# Patient Record
Sex: MALE | Race: WHITE | URBAN - METROPOLITAN AREA
[De-identification: names, ages, dates, MRNs, and addresses within clinical notes are randomized per-mention and may not be internally consistent; named-entity substitution may affect disease eponyms.]

---

## 2018-01-01 ENCOUNTER — APPOINTMENT (OUTPATIENT)
Dept: GENERAL RADIOLOGY | Age: 58
End: 2018-01-01
Payer: COMMERCIAL

## 2018-01-01 ENCOUNTER — HOSPITAL ENCOUNTER (EMERGENCY)
Age: 58
End: 2018-07-27
Attending: EMERGENCY MEDICINE | Admitting: SURGERY
Payer: COMMERCIAL

## 2018-01-01 ENCOUNTER — ANESTHESIA (OUTPATIENT)
Dept: OPERATING ROOM | Age: 58
End: 2018-01-01
Payer: COMMERCIAL

## 2018-01-01 ENCOUNTER — ANESTHESIA EVENT (OUTPATIENT)
Dept: OPERATING ROOM | Age: 58
End: 2018-01-01
Payer: COMMERCIAL

## 2018-01-01 VITALS — OXYGEN SATURATION: 91 % | HEART RATE: 103 BPM | SYSTOLIC BLOOD PRESSURE: 115 MMHG | DIASTOLIC BLOOD PRESSURE: 46 MMHG

## 2018-01-01 VITALS — OXYGEN SATURATION: 47 % | DIASTOLIC BLOOD PRESSURE: 82 MMHG | SYSTOLIC BLOOD PRESSURE: 166 MMHG

## 2018-01-01 DIAGNOSIS — I46.8 TRAUMATIC CARDIAC ARREST (HCC): Primary | ICD-10-CM

## 2018-01-01 DIAGNOSIS — T14.90XA TRAUMA: ICD-10-CM

## 2018-01-01 DIAGNOSIS — I46.9 CARDIORESPIRATORY ARREST (HCC): ICD-10-CM

## 2018-01-01 LAB
ABO/RH: NORMAL
ACETAMINOPHEN LEVEL: <5 MCG/ML (ref 10–30)
ALBUMIN SERPL-MCNC: 3.3 G/DL (ref 3.5–5.2)
ALP BLD-CCNC: 89 U/L (ref 40–129)
ALT SERPL-CCNC: 1149 U/L (ref 0–40)
ANION GAP SERPL CALCULATED.3IONS-SCNC: 32 MMOL/L (ref 7–16)
ANTIBODY SCREEN: NORMAL
APTT: 75.8 SEC (ref 24.5–35.1)
AST SERPL-CCNC: 937 U/L (ref 0–39)
B.E.: -13.3 MMOL/L (ref -3–0)
BILIRUB SERPL-MCNC: 0.3 MG/DL (ref 0–1.2)
BLOOD BANK DISPENSE STATUS: NORMAL
BLOOD BANK PRODUCT CODE: NORMAL
BPU ID: NORMAL
BUN BLDV-MCNC: 10 MG/DL (ref 6–20)
CALCIUM SERPL-MCNC: 9 MG/DL (ref 8.6–10.2)
CARDIOPULMONARY BYPASS: NO
CHLORIDE BLD-SCNC: 99 MMOL/L (ref 98–107)
CO2: 11 MMOL/L (ref 22–29)
COHB: 0.3 % (ref 0–1.5)
COMMENT: ABNORMAL
CREAT SERPL-MCNC: 1.2 MG/DL (ref 0.7–1.2)
CRITICAL NOTIFICATION: YES
CRITICAL: ABNORMAL
DATE ANALYZED: ABNORMAL
DATE OF COLLECTION: ABNORMAL
DESCRIPTION BLOOD BANK: NORMAL
DEVICE: ABNORMAL
ETHANOL: <10 MG/DL (ref 0–0.08)
FIO2 ARTERIAL: 100
FIO2: 100 %
GFR AFRICAN AMERICAN: >60
GFR NON-AFRICAN AMERICAN: >60 ML/MIN/1.73
GLUCOSE BLD-MCNC: 327 MG/DL (ref 74–109)
HCO3 ARTERIAL: 16.2 MMOL/L (ref 22–26)
HCT VFR BLD CALC: 34.6 % (ref 37–54)
HCT,ARTERIAL: 23 % (ref 37–54)
HEMOGLOBIN: 9.1 G/DL (ref 12.5–16.5)
HGB, ARTERIAL: 7.7 G/DL (ref 12.5–16.5)
HHB: 16.1 % (ref 0–5)
INR BLD: 1.2
LAB: ABNORMAL
LACTIC ACID: 19.2 MMOL/L (ref 0.5–2.2)
Lab: 1412
MCH RBC QN AUTO: 23.6 PG (ref 26–35)
MCHC RBC AUTO-ENTMCNC: 26.3 % (ref 32–34.5)
MCV RBC AUTO: 89.9 FL (ref 80–99.9)
METHB: 0.7 % (ref 0–1.5)
MODE: AC
O2 CONTENT: 12.6 ML/DL
O2 SATURATION: 83.7 % (ref 92–98.5)
O2 SATURATION: 84.5 % (ref 92–98.5)
O2HB: 82.9 % (ref 94–97)
OPERATOR ID: ABNORMAL
OPERATOR ID: ABNORMAL
PATIENT TEMP: 37 C
PCO2 ARTERIAL: 56.1 MMHG (ref 35–45)
PCO2: ABNORMAL MMHG (ref 35–45)
PDW BLD-RTO: 16 FL (ref 11.5–15)
PEEP/CPAP: 8 CMH?O
PFO2: 1 MMHG/%
PH BLOOD GAS: 7.07 (ref 7.35–7.45)
PH BLOOD GAS: ABNORMAL (ref 7.35–7.45)
PLATELET # BLD: 157 E9/L (ref 130–450)
PMV BLD AUTO: 9.3 FL (ref 7–12)
PO2 ARTERIAL: 69.4 MMHG (ref 80–100)
PO2: 99.7 MMHG (ref 60–100)
POTASSIUM SERPL-SCNC: 4.9 MMOL/L (ref 3.5–5)
POTASSIUM SERPL-SCNC: 4.93 MMOL/L (ref 3.3–5.1)
POTASSIUM SERPL-SCNC: 6.9 MMOL/L (ref 3.5–5.5)
PROTHROMBIN TIME: 13.9 SEC (ref 9.3–12.4)
RBC # BLD: 3.85 E12/L (ref 3.8–5.8)
RR MECHANICAL: 20 B/MIN
SALICYLATE, SERUM: <0.3 MG/DL (ref 0–30)
SODIUM BLD-SCNC: 142 MMOL/L (ref 132–146)
SOURCE, BLOOD GAS: ABNORMAL
SOURCE, BLOOD GAS: ABNORMAL
THB: 10.7 G/DL (ref 11.5–16.5)
TIME ANALYZED: 1413
TOTAL PROTEIN: 5.4 G/DL (ref 6.4–8.3)
TRICYCLIC ANTIDEPRESSANTS SCREEN SERUM: NEGATIVE NG/ML
VT MECHANICAL: 500 ML
WBC # BLD: 4.8 E9/L (ref 4.5–11.5)

## 2018-01-01 PROCEDURE — 86850 RBC ANTIBODY SCREEN: CPT

## 2018-01-01 PROCEDURE — 2709999900 HC NON-CHARGEABLE SUPPLY: Performed by: SURGERY

## 2018-01-01 PROCEDURE — 86923 COMPATIBILITY TEST ELECTRIC: CPT

## 2018-01-01 PROCEDURE — 85027 COMPLETE CBC AUTOMATED: CPT

## 2018-01-01 PROCEDURE — G0390 TRAUMA RESPONS W/HOSP CRITI: HCPCS

## 2018-01-01 PROCEDURE — 80053 COMPREHEN METABOLIC PANEL: CPT

## 2018-01-01 PROCEDURE — 2580000003 HC RX 258: Performed by: NURSE ANESTHETIST, CERTIFIED REGISTERED

## 2018-01-01 PROCEDURE — 93308 TTE F-UP OR LMTD: CPT | Performed by: SURGERY

## 2018-01-01 PROCEDURE — 36415 COLL VENOUS BLD VENIPUNCTURE: CPT

## 2018-01-01 PROCEDURE — 2580000003 HC RX 258: Performed by: ANESTHESIOLOGY

## 2018-01-01 PROCEDURE — 86920 COMPATIBILITY TEST SPIN: CPT

## 2018-01-01 PROCEDURE — 49000 EXPLORATION OF ABDOMEN: CPT | Performed by: SURGERY

## 2018-01-01 PROCEDURE — 83605 ASSAY OF LACTIC ACID: CPT

## 2018-01-01 PROCEDURE — 3600000014 HC SURGERY LEVEL 4 ADDTL 15MIN: Performed by: SURGERY

## 2018-01-01 PROCEDURE — 2720000010 HC SURG SUPPLY STERILE: Performed by: SURGERY

## 2018-01-01 PROCEDURE — 99223 1ST HOSP IP/OBS HIGH 75: CPT | Performed by: SURGERY

## 2018-01-01 PROCEDURE — 85730 THROMBOPLASTIN TIME PARTIAL: CPT

## 2018-01-01 PROCEDURE — 6360000002 HC RX W HCPCS: Performed by: NURSE ANESTHETIST, CERTIFIED REGISTERED

## 2018-01-01 PROCEDURE — 85610 PROTHROMBIN TIME: CPT

## 2018-01-01 PROCEDURE — 71045 X-RAY EXAM CHEST 1 VIEW: CPT

## 2018-01-01 PROCEDURE — 94150 VITAL CAPACITY TEST: CPT

## 2018-01-01 PROCEDURE — P9059 PLASMA, FRZ BETWEEN 8-24HOUR: HCPCS

## 2018-01-01 PROCEDURE — 6360000002 HC RX W HCPCS: Performed by: STUDENT IN AN ORGANIZED HEALTH CARE EDUCATION/TRAINING PROGRAM

## 2018-01-01 PROCEDURE — 86901 BLOOD TYPING SEROLOGIC RH(D): CPT

## 2018-01-01 PROCEDURE — P9035 PLATELET PHERES LEUKOREDUCED: HCPCS

## 2018-01-01 PROCEDURE — 82803 BLOOD GASES ANY COMBINATION: CPT

## 2018-01-01 PROCEDURE — 2500000003 HC RX 250 WO HCPCS: Performed by: ANESTHESIOLOGY

## 2018-01-01 PROCEDURE — 6360000002 HC RX W HCPCS: Performed by: EMERGENCY MEDICINE

## 2018-01-01 PROCEDURE — 72170 X-RAY EXAM OF PELVIS: CPT

## 2018-01-01 PROCEDURE — 84132 ASSAY OF SERUM POTASSIUM: CPT

## 2018-01-01 PROCEDURE — 6360000002 HC RX W HCPCS

## 2018-01-01 PROCEDURE — 3700000000 HC ANESTHESIA ATTENDED CARE: Performed by: SURGERY

## 2018-01-01 PROCEDURE — P9016 RBC LEUKOCYTES REDUCED: HCPCS

## 2018-01-01 PROCEDURE — 80307 DRUG TEST PRSMV CHEM ANLYZR: CPT

## 2018-01-01 PROCEDURE — 86900 BLOOD TYPING SEROLOGIC ABO: CPT

## 2018-01-01 PROCEDURE — G0480 DRUG TEST DEF 1-7 CLASSES: HCPCS

## 2018-01-01 PROCEDURE — 3700000001 HC ADD 15 MINUTES (ANESTHESIA): Performed by: SURGERY

## 2018-01-01 PROCEDURE — 2500000003 HC RX 250 WO HCPCS: Performed by: NURSE ANESTHETIST, CERTIFIED REGISTERED

## 2018-01-01 PROCEDURE — 3600000004 HC SURGERY LEVEL 4 BASE: Performed by: SURGERY

## 2018-01-01 PROCEDURE — 82805 BLOOD GASES W/O2 SATURATION: CPT

## 2018-01-01 PROCEDURE — 20102 EXPL PENTRG WND ABD/FLNK/BK: CPT | Performed by: SURGERY

## 2018-01-01 PROCEDURE — 99285 EMERGENCY DEPT VISIT HI MDM: CPT

## 2018-01-01 RX ORDER — MIDAZOLAM HYDROCHLORIDE 1 MG/ML
INJECTION INTRAMUSCULAR; INTRAVENOUS PRN
Status: DISCONTINUED | OUTPATIENT
Start: 2018-01-01 | End: 2018-01-01 | Stop reason: SDUPTHER

## 2018-01-01 RX ORDER — EPINEPHRINE 1 MG/ML
INJECTION INTRAMUSCULAR; INTRAVENOUS; SUBCUTANEOUS PRN
Status: DISCONTINUED | OUTPATIENT
Start: 2018-01-01 | End: 2018-01-01 | Stop reason: SDUPTHER

## 2018-01-01 RX ORDER — CALCIUM CHLORIDE 100 MG/ML
INJECTION INTRAVENOUS; INTRAVENTRICULAR PRN
Status: DISCONTINUED | OUTPATIENT
Start: 2018-01-01 | End: 2018-01-01 | Stop reason: SDUPTHER

## 2018-01-01 RX ORDER — SODIUM CHLORIDE 9 MG/ML
INJECTION, SOLUTION INTRAVENOUS CONTINUOUS PRN
Status: DISCONTINUED | OUTPATIENT
Start: 2018-01-01 | End: 2018-01-01 | Stop reason: SDUPTHER

## 2018-01-01 RX ORDER — CEFAZOLIN SODIUM 1 G/3ML
INJECTION, POWDER, FOR SOLUTION INTRAMUSCULAR; INTRAVENOUS PRN
Status: DISCONTINUED | OUTPATIENT
Start: 2018-01-01 | End: 2018-01-01 | Stop reason: SDUPTHER

## 2018-01-01 RX ORDER — VASOPRESSIN 20 U/ML
INJECTION PARENTERAL PRN
Status: DISCONTINUED | OUTPATIENT
Start: 2018-01-01 | End: 2018-01-01 | Stop reason: SDUPTHER

## 2018-01-01 RX ORDER — VECURONIUM BROMIDE 1 MG/ML
INJECTION, POWDER, LYOPHILIZED, FOR SOLUTION INTRAVENOUS PRN
Status: DISCONTINUED | OUTPATIENT
Start: 2018-01-01 | End: 2018-01-01 | Stop reason: SDUPTHER

## 2018-01-01 RX ADMIN — SODIUM BICARBONATE 50 MEQ: 84 INJECTION, SOLUTION INTRAVENOUS at 14:47

## 2018-01-01 RX ADMIN — SODIUM BICARBONATE 50 MEQ: 84 INJECTION, SOLUTION INTRAVENOUS at 14:25

## 2018-01-01 RX ADMIN — SODIUM BICARBONATE 50 MEQ: 84 INJECTION, SOLUTION INTRAVENOUS at 15:16

## 2018-01-01 RX ADMIN — EPINEPHRINE 1 MG: 0.1 INJECTION, SOLUTION ENDOTRACHEAL; INTRACARDIAC; INTRAVENOUS at 13:48

## 2018-01-01 RX ADMIN — VASOPRESSIN 2 UNITS: 20 INJECTION INTRAVENOUS at 14:46

## 2018-01-01 RX ADMIN — SODIUM CHLORIDE: 9 INJECTION, SOLUTION INTRAVENOUS at 14:45

## 2018-01-01 RX ADMIN — VASOPRESSIN 2 UNITS: 20 INJECTION INTRAVENOUS at 14:30

## 2018-01-01 RX ADMIN — CALCIUM CHLORIDE 1 G: 100 INJECTION, SOLUTION INTRAVENOUS; INTRAVENTRICULAR at 14:58

## 2018-01-01 RX ADMIN — MIDAZOLAM 2 MG: 1 INJECTION INTRAMUSCULAR; INTRAVENOUS at 14:52

## 2018-01-01 RX ADMIN — SODIUM BICARBONATE 50 MEQ: 84 INJECTION, SOLUTION INTRAVENOUS at 14:43

## 2018-01-01 RX ADMIN — EPINEPHRINE 1 MG: 0.1 INJECTION, SOLUTION ENDOTRACHEAL; INTRACARDIAC; INTRAVENOUS at 14:08

## 2018-01-01 RX ADMIN — SODIUM BICARBONATE 50 MEQ: 84 INJECTION, SOLUTION INTRAVENOUS at 14:22

## 2018-01-01 RX ADMIN — CALCIUM CHLORIDE 1 G: 100 INJECTION, SOLUTION INTRAVENOUS; INTRAVENTRICULAR at 14:57

## 2018-01-01 RX ADMIN — VASOPRESSIN 2 UNITS: 20 INJECTION INTRAVENOUS at 15:06

## 2018-01-01 RX ADMIN — CALCIUM CHLORIDE 1 G: 100 INJECTION, SOLUTION INTRAVENOUS; INTRAVENTRICULAR at 15:20

## 2018-01-01 RX ADMIN — EPINEPHRINE 1 MG: 0.1 INJECTION, SOLUTION ENDOTRACHEAL; INTRACARDIAC; INTRAVENOUS at 15:22

## 2018-01-01 RX ADMIN — EPINEPHRINE 10 MCG: 1 INJECTION INTRAMUSCULAR; INTRAVENOUS; SUBCUTANEOUS at 14:43

## 2018-01-01 RX ADMIN — TRANEXAMIC ACID 1000 MG: 100 INJECTION, SOLUTION INTRAVENOUS at 14:38

## 2018-01-01 RX ADMIN — VASOPRESSIN 2 UNITS: 20 INJECTION INTRAVENOUS at 15:10

## 2018-01-01 RX ADMIN — EPINEPHRINE 20 MCG: 1 INJECTION INTRAMUSCULAR; INTRAVENOUS; SUBCUTANEOUS at 14:22

## 2018-01-01 RX ADMIN — Medication 4 MCG/MIN: at 14:46

## 2018-01-01 RX ADMIN — VASOPRESSIN 2 UNITS: 20 INJECTION INTRAVENOUS at 15:00

## 2018-01-01 RX ADMIN — VASOPRESSIN 2 UNITS: 20 INJECTION INTRAVENOUS at 14:50

## 2018-01-01 RX ADMIN — EPINEPHRINE 1 MG: 0.1 INJECTION, SOLUTION ENDOTRACHEAL; INTRACARDIAC; INTRAVENOUS at 14:45

## 2018-01-01 RX ADMIN — SODIUM CHLORIDE: 9 INJECTION, SOLUTION INTRAVENOUS at 14:22

## 2018-01-01 RX ADMIN — SODIUM CHLORIDE: 9 INJECTION, SOLUTION INTRAVENOUS at 14:55

## 2018-01-01 RX ADMIN — EPINEPHRINE 1 MG: 0.1 INJECTION, SOLUTION ENDOTRACHEAL; INTRACARDIAC; INTRAVENOUS at 15:11

## 2018-01-01 RX ADMIN — VASOPRESSIN 2 UNITS: 20 INJECTION INTRAVENOUS at 14:55

## 2018-01-01 RX ADMIN — EPINEPHRINE 20 MCG: 1 INJECTION INTRAMUSCULAR; INTRAVENOUS; SUBCUTANEOUS at 14:33

## 2018-01-01 RX ADMIN — EPINEPHRINE 1 MG: 0.1 INJECTION, SOLUTION ENDOTRACHEAL; INTRACARDIAC; INTRAVENOUS at 15:16

## 2018-01-01 RX ADMIN — VASOPRESSIN 2 UNITS: 20 INJECTION INTRAVENOUS at 15:22

## 2018-01-01 RX ADMIN — VECURONIUM BROMIDE FOR INJECTION 10 MG: 1 INJECTION, POWDER, LYOPHILIZED, FOR SOLUTION INTRAVENOUS at 14:33

## 2018-01-01 RX ADMIN — SODIUM BICARBONATE 50 MEQ: 84 INJECTION, SOLUTION INTRAVENOUS at 14:46

## 2018-01-01 RX ADMIN — CEFAZOLIN 2000 MG: 1 INJECTION, POWDER, FOR SOLUTION INTRAVENOUS at 14:33

## 2018-01-01 RX ADMIN — SODIUM CHLORIDE: 9 INJECTION, SOLUTION INTRAVENOUS at 15:14

## 2018-01-01 RX ADMIN — SODIUM BICARBONATE 50 MEQ: 84 INJECTION, SOLUTION INTRAVENOUS at 15:05

## 2018-01-01 RX ADMIN — CALCIUM CHLORIDE 1 G: 100 INJECTION, SOLUTION INTRAVENOUS; INTRAVENTRICULAR at 14:22

## 2018-01-01 RX ADMIN — SODIUM BICARBONATE 50 MEQ: 84 INJECTION, SOLUTION INTRAVENOUS at 14:45

## 2018-01-01 RX ADMIN — VASOPRESSIN 2 UNITS: 20 INJECTION INTRAVENOUS at 15:17

## 2018-01-01 RX ADMIN — VASOPRESSIN 2 UNITS: 20 INJECTION INTRAVENOUS at 14:41

## 2018-01-01 ASSESSMENT — PULMONARY FUNCTION TESTS
PIF_VALUE: 2
PIF_VALUE: 33
PIF_VALUE: 53
PIF_VALUE: 63
PIF_VALUE: 37
PIF_VALUE: 31
PIF_VALUE: 34
PIF_VALUE: 1
PIF_VALUE: 39
PIF_VALUE: 40
PIF_VALUE: 53
PIF_VALUE: 43
PIF_VALUE: 33
PIF_VALUE: 37
PIF_VALUE: 33
PIF_VALUE: 41
PIF_VALUE: 53
PIF_VALUE: 52
PIF_VALUE: 39
PIF_VALUE: 2
PIF_VALUE: 31
PIF_VALUE: 0
PIF_VALUE: 32
PIF_VALUE: 32
PIF_VALUE: 53
PIF_VALUE: 38
PIF_VALUE: 52
PIF_VALUE: 40
PIF_VALUE: 40
PIF_VALUE: 43
PIF_VALUE: 53
PIF_VALUE: 0
PIF_VALUE: 0
PIF_VALUE: 32
PIF_VALUE: 37
PIF_VALUE: 42
PIF_VALUE: 53
PIF_VALUE: 3
PIF_VALUE: 42
PIF_VALUE: 0
PIF_VALUE: 53
PIF_VALUE: 31
PIF_VALUE: 34
PIF_VALUE: 54
PIF_VALUE: 1
PIF_VALUE: 2
PIF_VALUE: 0
PIF_VALUE: 41
PIF_VALUE: 41
PIF_VALUE: 63
PIF_VALUE: 44
PIF_VALUE: 40
PIF_VALUE: 40
PIF_VALUE: 33
PIF_VALUE: 32
PIF_VALUE: 3
PIF_VALUE: 53
PIF_VALUE: 37
PIF_VALUE: 39
PIF_VALUE: 1
PIF_VALUE: 1
PIF_VALUE: 35
PIF_VALUE: 41
PIF_VALUE: 1
PIF_VALUE: 39
PIF_VALUE: 53
PIF_VALUE: 33
PIF_VALUE: 39
PIF_VALUE: 37
PIF_VALUE: 33
PIF_VALUE: 40
PIF_VALUE: 33
PIF_VALUE: 41
PIF_VALUE: 33
PIF_VALUE: 36
PIF_VALUE: 32
PIF_VALUE: 63

## 2018-07-27 NOTE — ED NOTES
3RD UNIT [facility] [id number] [address] [place] [address], DEANDRA  07/27/18 4358

## 2018-07-27 NOTE — PROCEDURES
Lauro Washington is a 62 y.o. male patient. No diagnosis found. No past medical history on file. Blood pressure (!) 115/46, pulse 103, SpO2 91 %. Central Line  Date/Time: 7/27/2018 4:13 PM  Performed by: Harrison Lainez  Authorized by: Chasity Che   Consent: The procedure was performed in an emergent situation. Verbal consent not obtained. Written consent not obtained. Site marked: the operative site was not marked  Imaging studies: imaging studies not available  Patient identity confirmed: anonymous protocol, patient vented/unresponsive  Indications: vascular access    Sedation:  Patient sedated: no  Preparation: skin prepped with ChloraPrep  Skin prep agent dried: skin prep agent completely dried prior to procedure  Location details: right femoral  Site selection rationale: Accessability, traumatic case, CPR  Patient position: flat  Catheter type: Cordis  Catheter size: 7.5 Fr  Pre-procedure: landmarks identified  Ultrasound guidance: no  Number of attempts: 1  Successful placement: yes  Post-procedure: line sutured and dressing applied  Assessment: blood return through all ports  Complications: None. Patient tolerance: Patient tolerated the procedure well with no immediate complications  Comments: Attending was present for the duration of the procedure.           Thelma Mcguire DO  7/27/2018

## 2018-07-27 NOTE — CONSULTS
Vascular Surgery Consultation Note    Reason for Consult:  Hemorrhagic shock, intra-op bleeding    HPI:    This is a 62 y.o. male who is admitted to the hospital as a trauma team in extremis. Per reports he fell from and was pinned by a tree/harness. Prolonged extrication with CPR en route and in TB. ROSC achieved and +fast taken for laparotomy. Vascular surgery called intra-op for refractory bleeding from mesentery/retroperioneum. CPR had been reinitiated intra-op and blood bank alert with massive transfusion occurring. Assesment/Plan  62 y.o. male with traumatic arrest, hemorrhagic shock, intraabdominal hemorrhage    Injuries non-survivable and refractory to heroic resuscitation. Please see Op note    Tasneem Wilson DO  7/27/18  3:53 PM      Agree with above. Limited examination secondary these issues. Intra-abdominal request to assist in control of bleeding as stated above.     Leanne Majors

## 2018-07-27 NOTE — PROGRESS NOTES
The patient arrived into the trauma already undergoing ACLS for a total of 15 minutes per EMS. He had suffered a blunt traumatic injury. He was intubated and pupils were fixed and dilated upon arrival and received 4 rounds of epinephrine. After one additional round of ACLS ROSC was achieved and resuscitation continued. Introducer was placed during this time and blood was given thru the level one. FAST exam was performed and initially was negative. CXR showed no signs of hemothorax of pneumothorax. Pelvic X-ray did not reveal an open pelvic book fracture and no femur fracture was appreciated. As resuscitation continued the patient lost pulses and ROSC was achieved with one round of ACLS. Repeat FAST exam was performed and was positive for blood noted in the right upper quadrant. Patient went stat to the OR at this point for exploratory laparotomy.     Dr. Avis Grant was present the entire time    Lexi Villalba MD

## 2018-07-27 NOTE — ANESTHESIA PRE PROCEDURE
History:  No past surgical history on file. Social History:    Social History   Substance Use Topics    Smoking status: Not on file    Smokeless tobacco: Not on file    Alcohol use Not on file                                Counseling given: Not Answered      Vital Signs (Current):   Vitals:    07/27/18 1405 07/27/18 1412 07/27/18 1415 07/27/18 1417   BP:  (!) 130/50 (!) 120/52 (!) 115/46   Pulse: 103 133 108 103   SpO2:  (!) 86% 92% 91%                                              BP Readings from Last 3 Encounters:   07/27/18 (!) 115/46   07/27/18 (!) 166/82       NPO Status:  unknown - intubated                                                                               BMI:   Wt Readings from Last 3 Encounters:   No data found for Wt     There is no height or weight on file to calculate BMI.    CBC:   Lab Results   Component Value Date    WBC 4.8 07/27/2018    RBC 3.85 07/27/2018    HGB 9.1 07/27/2018    HCT 34.6 07/27/2018    MCV 89.9 07/27/2018    RDW 16.0 07/27/2018     07/27/2018       CMP:   Lab Results   Component Value Date     07/27/2018    K 6.9 07/27/2018    CL 99 07/27/2018    CO2 11 07/27/2018    BUN 10 07/27/2018    CREATININE 1.2 07/27/2018    GFRAA >60 07/27/2018    LABGLOM >60 07/27/2018    GLUCOSE 327 07/27/2018    PROT 5.4 07/27/2018    CALCIUM 9.0 07/27/2018    BILITOT 0.3 07/27/2018    ALKPHOS 89 07/27/2018     07/27/2018    ALT 1,149 07/27/2018       POC Tests: No results for input(s): POCGLU, POCNA, POCK, POCCL, POCBUN, POCHEMO, POCHCT in the last 72 hours.     Coags:   Lab Results   Component Value Date    PROTIME 13.9 07/27/2018    INR 1.2 07/27/2018    APTT 75.8 07/27/2018       HCG (If Applicable): No results found for: PREGTESTUR, PREGSERUM, HCG, HCGQUANT     ABGs:   Lab Results   Component Value Date    PO2ART 69.4 07/27/2018    EUG9PQS 56.1 07/27/2018    VKI0YQJ 16.2 07/27/2018        Type & Screen (If Applicable):  No results found for: LABABO,

## 2018-07-27 NOTE — H&P
History:  unknown    Family History: Non-contributory    NSAID use in last 72 hours: unknown  Taken PCN in past:  unknown  Last food/drink: unknown  Last tetanus: unknown    Complaints:     Unable to assess    SECONDARY SURVEY  Head/scalp: No soft tissue injuries    Face: No soft tissue injuries    Eyes/ears/nose: sputum in nares    Pharynx/mouth:  No soft tissue injury, no malocclusion    Neck: No soft tissue injuries  Cervical spine tenderness: unable to assess  ROM:  Cervical collar in place    Chest wall:  CTAB, no crepitus appreciated, no soft tissue injuries     Heart:  tach    Abdomen: Soft, non-distended, no soft tissue injuries   Tenderness:  unable to assess    Pelvis: Stable, no soft tissue injuries, no pain with hip flexion   Tenderness: unable to assess    Thoracolumbar spine: No soft tissue injuries, no step-offs  Tenderness:  Unable to assess     Genitourinary:  no traumatic injuries    Rectum: lose rectal tone, no blood     Perineum: no traumatic injuries    Extremities:   Sensory unable to assess   Motor 0/5    Distal Pulses  Pulses decreased     Capillary refill   Left arm normal  Right arm normal  Left leg normal   Right leg normal    Procedures in ED: FFP and PRBC in trauma bay, ACLS see nursing notes      Radiology:  CXR: pend  PXR: pend    Consultations:      Admission/Diagnosis:   80 y.o. male s/p fall from tree with traumatic arrest and positive fast     Code Status: Full    Plan of Treatment:    Positive fast, Stat Exlap in [address] [place] [address] discussed with Dr. Bee Hutchinson on 2018 at 2:14 PM    Workup pendin Aleksandr Lancaster on 2018 at 2:14 PM  Attending Physician Statement:  I have examined the patient, reviewed the record, and discussed the case with the surgical team.  I agree with the assessment and plan with the following additions, corrections, and changes. Male trauma patient, pinned in tree by large branch.  Intubated at scene, CPR 15 minutes before

## 2018-07-27 NOTE — ED PROVIDER NOTES
Department of Emergency Medicine   ED  Provider Note  Admit Date/RoomTime: 7/27/2018  1:46 PM  ED Room: OR POOL /NONE                HPI:  7/27/18, Time: 2:16 PM  .       Adelaida Tidwell is a 62 y.o. male presenting to the ED as a trauma team, beginning just prior to arrival .  The complaint has been constant,severe in severity. Patient was working on a tree entry removal. Patient was harnessed in when he cut a branch this resulted in him being pulled and then crushed between branch and trunk. Patient was unconscious at EMS arrival state there is a 30 minute time frame to extricate the patient. Patient was intubated on scene. They state pulses were lost 15 minutes out. Patient has been in PEA arrest since. Please note, this patient arrived as a Trauma Team and the trauma service assumed the care of this patient on their arrival    Initial evaluation occurred with trauma services at bedside. This patients disposition will be determined by trauma services. Glascow Coma Scale at time of initial examination  Best Eye Response 1 - Does not open eyes   Best Verbal Response 1 - Makes no noise   Best Motor Response 1 - No motor response to pain   Total 3         Review of Systems:   Pertinent positives and negatives are stated within HPI, all other systems reviewed and are negative.              --------------------------------------------- PAST HISTORY ---------------------------------------------  Past Medical History:  has no past medical history on file. Past Surgical History:  has no past surgical history on file. Social History:      Family History: family history is not on file. The patients home medications have been reviewed. Allergies: Patient has no allergy information on record.            ------------------------- NURSING NOTES AND VITALS REVIEWED ---------------------------   The nursing notes within the ED encounter and vital signs as below have been reviewed.    BP (!) 115/46 pH, Blood Gas (<) 7.350 - 7.450    PCO2  35.0 - 45.0 mmHg    PO2 99.7 60.0 - 100.0 mmHg    O2 Sat 83.7 (L) 92.0 - 98.5 %    PO2/FIO2 1.00 mmHg/%    O2Hb 82.9 (L) 94.0 - 97.0 %    COHb 0.3 0.0 - 1.5 %    MetHb 0.7 0.0 - 1.5 %    O2 Content 12.6 mL/dL    HHb 16.1 (H) 0.0 - 5.0 %    tHb (est) 10.7 (L) 11.5 - 16.5 g/dL    Potassium 4.93 3.30 - 5.10 mmol/L    Mode AC     FIO2 100.0 %    Rr Mechanical 20.0 b/min    Vt Mechanical 500 mL    Peep/Cpap 8.0 cmH? O    Comment       PH < 7.21, ANALYZER DID NOT GIVE PCO2 RESULT        SAMPLE RE RAN FORGOT TO PUT VENT SETTINGS    Date Of Collection [id number]     Time Collected 1412     Pt Temp 37.0 C     ID [id number]     Lab [id number]     Critical(s) Notified Handed report to Dr/RN    Arterial Blood Gas, Respiratory Only   Result Value Ref Range    Source: Arterial     FIO2 Arterial 100.0     pH, Blood Gas 7.067 (LL) 7.350 - 7.450    pCO2, Arterial 56.1 (H) 35.0 - 45.0 mmHg    pO2, Arterial 69.4 (L) 80.0 - 100.0 mmHg    HCO3, Arterial 16.2 (L) 22.0 - 26.0 mmol/L    B.E. -13.3 (L) -3.0 - 0.0 mmol/L    O2 Sat 84.5 (L) 92.0 - 98.5 %    Potassium 6.9 (HH) 3.5 - 5.5 mmol/L    HGB, Arterial 7.7 (L) 12.5 - 16.5 g/dL    HCT, Arterial 23.0 (L) 37.0 - 54.0 %    Cardiopulmonary Bypass No      ,511     DEVICE [phone]     Critical Notification Yes    Type and Screen   Result Value Ref Range    ABO/Rh O POS     Antibody Screen NEG    Prepare RBC Crossmatch   Result Value Ref Range    Product Code Blood Bank [id number]     Description Blood Bank Red Blood Cells, Apheresis, Leuko-reduced     Unit Number [id number]     Dispense Status Blood Bank transfused     Product Code Blood Bank N5112B00     Description Blood Bank Red Blood Cells, Apheresis, Leuko-reduced     Unit Number [id number]     Dispense Status Blood Bank transfused     Product Code Blood Bank K9484B87     Description Blood Bank Red Blood Cells, Leuko-reduced     Unit Number [id number]     Dispense Unit Number [id number]     Dispense Status Blood Bank released     Product Code Blood Bank G8691S38     Description Blood Bank Red Blood Cells, Leuko-reduced     Unit Number [id number]     Dispense Status Blood Bank released     Product Code Blood Bank J6988P68     Description Blood Bank Red Blood Cells, Leuko-reduced     Unit Number [id number]     Dispense Status Blood Bank released     Product Code Blood Bank G2397U03     Description Blood Bank Red Blood Cells, Leuko-reduced     Unit Number [id number]     Dispense Status Blood Bank released     Product Code Blood Bank S4576V26     Description Blood Bank Red Blood Cells, Leuko-reduced     Unit Number [id number]     Dispense Status Blood Bank released     Product Code Blood Bank M2998Q00     Description Blood Bank Red Blood Cells, Leuko-reduced     Unit Number [id number]     Dispense Status Blood Bank transfused     Product Code Blood Bank I3085U62     Description Blood Bank Red Blood Cells, Leuko-reduced     Unit Number [id number]     Dispense Status Blood Bank transfused     Product Code Blood Bank M0542Y64     Description Blood Bank Red Blood Cells, Leuko-reduced     Unit Number [id number]     Dispense Status Blood Bank transfused     Product Code Blood Bank G2571C06     Description Blood Bank Red Blood Cells, Leuko-reduced     Unit Number [id number]     Dispense Status Blood Bank transfused     Product Code Blood Bank S9418Y11     Description Blood Bank Red Blood Cells, Leuko-reduced     Unit Number [id number]     Dispense Status Blood Bank transfused     Product Code Blood Bank H7527M09     Description Blood Bank Red Blood Cells, Leuko-reduced     Unit Number [id number]     Dispense Status Blood Bank transfused     Product Code Blood Bank K2416M15     Description Blood Bank Red Blood Cells, Leuko-reduced     Unit Number [id number]     Dispense Status Blood Bank transfused     Product Code Blood Bank M5051S21     Description Blood Bank Red Blood Cells, Leuko-reduced     Unit Number [id number]     Dispense Status Blood Bank transfused    Prepare fresh frozen plasma   Result Value Ref Range    Product Code Blood Bank E8679A75     Description Blood Bank Plasma, 5 Day, Thawed     Unit Number [id number]     Dispense Status Blood Bank transfused     Product Code Blood Bank K6045E27     Description Blood Bank Plasma, 5 Day, Thawed     Unit Number [id number]     Dispense Status Blood Bank transfused     Product Code Blood Bank R2252G15     Description Blood Bank Plasma, 5 Day, Thawed     Unit Number [id number]     Dispense Status Blood Bank transfused     Product Code Blood Bank I7611L19     Description Blood Bank Plasma, 5 Day, Thawed     Unit Number [id number]     Dispense Status Blood Bank transfused     Product Code Blood Bank B7806L50     Description Blood Bank Plasma, 5 Day, Thawed     Unit Number [id number]     Dispense Status Blood Bank transfused     Product Code Blood Bank T5256L67     Description Blood Bank Plasma, 5 Day, Thawed     Unit Number [id number]     Dispense Status Blood Bank transfused     Product Code Blood Bank J1436L30     Description Blood Bank Plasma, 5 Day, Thawed     Unit Number [id number]     Dispense Status Blood Bank transfused     Product Code Blood Bank P8399H51     Description Blood Bank Plasma, 5 Day, Thawed     Unit Number [id number]     Dispense Status Blood Bank transfused     Product Code Blood Bank K1339R38     Description Blood Bank Plasma, 5 Day, Thawed     Unit Number [id number]     Dispense Status Blood Bank transfused     Product Code Blood Bank N0589O09     Description Blood Bank Plasma, 5 Day, Thawed     Unit Number [id number]     Dispense Status Blood Bank transfused     Product Code Blood Bank R8551K18     Description Blood Bank Plasma, 5 Day, Thawed     Unit Number [id number]     Dispense Status Blood Bank transfused     Product Code Blood Bank P2046Y02 Description Blood Bank Plasma, 5 Day, Thawed     Unit Number [id number]     Dispense Status Blood Bank transfused     Product Code Blood Bank U2598F11     Description Blood Bank Plasma, 5 Day, Thawed     Unit Number [id number]     Dispense Status Blood Bank released     Product Code Blood Bank U3746P99     Description Blood Bank Plasma, 5 Day, Thawed     Unit Number [id number]     Dispense Status Blood Bank released     Product Code Blood Bank D0652J82     Description Blood Bank Plasma, 5 Day, Thawed     Unit Number [id number]     Dispense Status Blood Bank released     Product Code Blood Bank A7239E74     Description Blood Bank Plasma, 5 Day, Thawed     Unit Number [id number]     Dispense Status Blood Bank released     Product Code Blood Bank X0914N92     Description Blood Bank Plasma, 5 Day, Thawed     Unit Number [id number]     Dispense Status Blood Bank released     Product Code Blood Bank R7270H83     Description Blood Bank Plasma, 5 Day, Thawed     Unit Number [id number]     Dispense Status Blood Bank transfused     Product Code Blood Bank A8764X10     Description Blood Bank Plasma, 5 Day, Thawed     Unit Number [id number]     Dispense Status Blood Bank transfused     Product Code Blood Bank Y2244R13     Description Blood Bank Plasma, 5 Day, Thawed     Unit Number [id number]     Dispense Status Blood Bank transfused     Product Code Blood Bank C1208Y38     Description Blood Bank Plasma, 5 Day, Thawed     Unit Number [id number]     Dispense Status Blood Bank transfused    Prepare Platelets (Crossmatch)   Result Value Ref Range    Product Code Blood Bank P0599S09     Description Blood Bank Platelets, Apheresis, Leuko-reduced     Unit Number [id number]     Dispense Status Blood Bank transfused     Product Code Blood Bank T3837P72     Description Blood Bank Platelets, Apheresis, Leuko-reduced     Unit Number [id number]     Dispense Status Blood Bank transfused    Prepare

## 2018-07-27 NOTE — ED NOTES
Patient log rolled maintaining spinal neutrality. NO stepoffs OR deformities noted.  LOOSE RECTAL TONE, NO GROSS BLOOD NOTED BY DR. Luci Pulido, RN  07/27/18 1635

## 2018-07-28 LAB
B.E.: -16.9 MMOL/L (ref -3–0)
CARDIOPULMONARY BYPASS: NO
CRITICAL NOTIFICATION: YES
DEVICE: ABNORMAL
FIO2 ARTERIAL: 100
HCO3 ARTERIAL: 15.5 MMOL/L (ref 22–26)
HCT,ARTERIAL: 30 % (ref 37–54)
HGB, ARTERIAL: 10.2 G/DL (ref 12.5–15.5)
O2 SATURATION: 94.8 % (ref 92–98.5)
OPERATOR ID: ABNORMAL
PCO2 ARTERIAL: 74 MMHG (ref 35–45)
PH BLOOD GAS: 6.93 (ref 7.35–7.45)
PO2 ARTERIAL: 122.2 MMHG (ref 80–100)
POTASSIUM SERPL-SCNC: 4.6 MMOL/L (ref 3.5–5.5)
SOURCE, BLOOD GAS: ABNORMAL

## 2018-07-28 PROCEDURE — 2500000003 HC RX 250 WO HCPCS

## 2018-07-28 PROCEDURE — 6360000002 HC RX W HCPCS

## 2018-07-28 NOTE — OP NOTE
Preoperative diagnosis: Acute traumatic injury, hemodynamic instability, suspected intra-abdominal bleed  Postoperative diagnosis: Same, zone 1 retroperitoneal hematoma, cardiopulmonary failure  Procedure: Exploratory laparotomy, exploration of retroperitoneum  Surgeon: Radha Reyez  Assistant: Bernardino Pascal  Anesthesia: Gen. Findings: No acute intra-abdominal hemorrhage. No solid or hollow viscus injury. Small amount of free intraperitoneal blood. Zone 1 retroperitoneal hematoma, small, with no significant bleeding from inferior vena cava or aorta  Indications: Patient is a 71-year-old male who suffered an acute traumatic injury well up in a tree cutting off a large lamina. He was pinned against a tree by the limb. He had to be extricated from the tree by the fire department. He lost vital signs in route to the emergency department. CPR was initiated for approximately 15 minutes before arrival at the trauma bay. Of note, patient's pupils were fixed and dilated upon arrival. Once in the trauma bay, pulse and cardiac function were able be reestablished. Patient exhibited periods of stability combined with instability. Initial fast examination was negative, however 2nd exam was positive. Patient was taken emergently to the operating room for abdominal exploration. Description of procedure  Procedures performed the operating room under general anesthesia. Patient was prepped and draped in standard sterile fashion. Midline laparotomy incision was created from xiphoid to pubis. Abdomen was then entered with cautery. Small amount of blood was evacuated. 4 quadrant packs were applied. These were then slowly removed, 1st inspecting the left upper quadrant point. Spleen was intact. There were no injuries noted or lacerations. There is no bleeding seen in this area. Packs removed from around the liver. Again liver capsule was intact, no bleeding from the liver was noted.  Pelvic packs were then removed,
root of  the mesentery and there were ongoing chest compressions. With the aid of  trauma services, suction and traction were carried out in order to isolate  the source of venous bleeding. Aggressive ongoing resuscitation was also  occurring on behalf of anesthesia services. There was large volume venous  bleeding coming from an apparent branch of the middle colic artery and vein which  was clipped and bleeding markedly slowed with that. There was still quite  voluminous bleeding realized from the mesentery about the location of the  SMV through which a large figure-of-eight 0 Prolene suture was placed to  aid with hemostasis and despite these measures and ongoing resuscitation,  ROSC was unachievable without ongoing CPR. Ultimately, the decision was made between anesthesia,trauma  and vascular services that given the patient's  course thus far and refractory resuscitation that these injuries were  nonsurvivable and ongoing aggressive measures were futile. With that, the  procedure was terminated. It should be noted that after cessation of  aggressive resuscitation, the retroperitoneum was further inspected. The  IVC was evaluated. There was no evidence for active bleeding or injury to  the IVC. This iliac bifurcation was too investigated and the IVC and both  iliac veins were quite contused again without apparent source of  bleeding there. The aorta too was intact as visualized. The remainder of  the dictation and complete description of course of events and findings to  be dictated by trauma services. Kyree Desir DO    D: 07/27/2018 16:13:37       T: 07/27/2018 21:47:11     [email or web address]  Job#: [id number]     Doc#: [id number]    CC:    Patient was seen and examined. Agree with above. Control of intraoperative bleeding secondary to venous bleeding at the base of the mesentery.

## (undated) DEVICE — DOUBLE BASIN SET: Brand: MEDLINE INDUSTRIES, INC.

## (undated) DEVICE — SET INSTRUMENT LAP I

## (undated) DEVICE — SOLUTION IV IRRIG POUR BRL 0.9% SODIUM CHL 2F7124

## (undated) DEVICE — STAPLER INT 75MM CUT LN L73MM STPL LN L77MM LNAR B-FORM

## (undated) DEVICE — ELECTRODE PT RET AD L9FT HI MOIST COND ADH HYDRGEL CORDED

## (undated) DEVICE — DRAPE THER FLUID WARMING 66X44 IN FLAT SLUSH DBL DISC ORS

## (undated) DEVICE — SURGICAL PROCEDURE PACK TRAUM

## (undated) DEVICE — SEALER TISS L20CM DIA13MM ADV BPLR L CRV JAW OPN APPRCH

## (undated) DEVICE — SOLUTION IV IRRIG WATER 1000ML POUR BRL 2F7114

## (undated) DEVICE — SET INSTRUMENT LAP II